# Patient Record
Sex: MALE | Race: WHITE
[De-identification: names, ages, dates, MRNs, and addresses within clinical notes are randomized per-mention and may not be internally consistent; named-entity substitution may affect disease eponyms.]

---

## 2023-03-07 ENCOUNTER — HOSPITAL ENCOUNTER (EMERGENCY)
Dept: HOSPITAL 4 - SED | Age: 68
Discharge: HOME | End: 2023-03-07
Payer: COMMERCIAL

## 2023-03-07 VITALS — SYSTOLIC BLOOD PRESSURE: 130 MMHG

## 2023-03-07 VITALS — WEIGHT: 170 LBS | HEIGHT: 68 IN | BODY MASS INDEX: 25.76 KG/M2

## 2023-03-07 DIAGNOSIS — R10.32: Primary | ICD-10-CM

## 2023-03-07 DIAGNOSIS — Z79.899: ICD-10-CM

## 2023-03-07 LAB
ALBUMIN SERPL BCP-MCNC: 4.1 G/DL (ref 3.4–4.8)
ALT SERPL W P-5'-P-CCNC: 24 U/L (ref 12–78)
ANION GAP SERPL CALCULATED.3IONS-SCNC: 9 MMOL/L (ref 5–15)
APPEARANCE UR: CLEAR
AST SERPL W P-5'-P-CCNC: 5 U/L (ref 10–37)
BASOPHILS # BLD AUTO: 0.1 K/UL (ref 0–0.2)
BASOPHILS NFR BLD AUTO: 0.8 % (ref 0–2)
BILIRUB SERPL-MCNC: 0.5 MG/DL (ref 0–1)
BILIRUB UR QL STRIP: NEGATIVE
BUN SERPL-MCNC: 21 MG/DL (ref 8–21)
CALCIUM SERPL-MCNC: 9.2 MG/DL (ref 8.4–11)
CHLORIDE SERPL-SCNC: 101 MMOL/L (ref 98–107)
COLOR UR: YELLOW
CREAT SERPL-MCNC: 1 MG/DL (ref 0.55–1.3)
EOSINOPHIL # BLD AUTO: 0.1 K/UL (ref 0–0.4)
EOSINOPHIL NFR BLD AUTO: 2 % (ref 0–4)
ERYTHROCYTE [DISTWIDTH] IN BLOOD BY AUTOMATED COUNT: 13.5 % (ref 9–15)
GFR SERPL CREATININE-BSD FRML MDRD: 96 ML/MIN (ref 90–?)
GLUCOSE SERPL-MCNC: 98 MG/DL (ref 70–99)
GLUCOSE UR STRIP-MCNC: NEGATIVE MG/DL
HCT VFR BLD AUTO: 49.4 % (ref 36–54)
HGB BLD-MCNC: 16.7 G/DL (ref 14–18)
HGB UR QL STRIP: NEGATIVE
KETONES UR STRIP-MCNC: NEGATIVE MG/DL
LEUKOCYTE ESTERASE UR QL STRIP: NEGATIVE
LIPASE SERPL-CCNC: 170 U/L (ref 73–393)
LYMPHOCYTES # BLD AUTO: 1.8 K/UL (ref 1–5.5)
LYMPHOCYTES NFR BLD AUTO: 26 % (ref 20.5–51.5)
MCH RBC QN AUTO: 29 PG (ref 27–31)
MCHC RBC AUTO-ENTMCNC: 34 % (ref 32–36)
MCV RBC AUTO: 87 FL (ref 79–98)
MONOCYTES # BLD MANUAL: 0.4 K/UL (ref 0–1)
MONOCYTES # BLD MANUAL: 5.9 % (ref 1.7–9.3)
NEUTROPHILS # BLD AUTO: 4.5 K/UL (ref 1.8–7.7)
NEUTROPHILS NFR BLD AUTO: 65.3 % (ref 40–70)
NITRITE UR QL STRIP: NEGATIVE
PH UR STRIP: 5.5 [PH] (ref 5–8)
PLATELET # BLD AUTO: 97 K/UL (ref 130–430)
PROT UR QL STRIP: NEGATIVE
RBC # BLD AUTO: 5.69 MIL/UL (ref 4.2–6.2)
SP GR UR STRIP: 1.02 (ref 1–1.03)
UROBILINOGEN UR STRIP-MCNC: 0.2 MG/DL (ref 0.2–1)
WBC # BLD AUTO: 6.9 K/UL (ref 4.8–10.8)

## 2023-03-07 NOTE — NUR
BIB SELF FROM HOME WITH C/O LEFT ABN PAIN - 4/10. PT STATES HE FEELS A SHARP, 
STABBING, AND PULSATING SENSATION ON HIS LEFT ABN AREA. PT STATES HE HAD A 
COLONSCOPY PERFORMED ON JAN 30 AND HAS HAD PAIN SINCE THE PROCEDURE. 



HX - HYP LIP.



PT IS AAX04, NAD, VSS, BREATHING EVEN AND UNLABORED ON RA, PT REST ON GURNEY IN 
HALLWAY. 



SAFETY PRECAUTIONS AND COMFORT MEASURES IN PLACE.



PENDING MD MAZA AND ORDERS.

## 2023-03-07 NOTE — NUR
Patient given written and verbal discharge instructions and verbalizes 
understanding.  ER MD discussed with patient the results and treatment 
provided. Patient in stable condition. ID arm band removed. 

NO Rx  given. Patient educated on pain management and to follow up with PMD. 
Pain Scale 0.

Opportunity for questions provided and answered. Medication side effect fact 
sheet provided.